# Patient Record
Sex: FEMALE | Race: WHITE | NOT HISPANIC OR LATINO | Employment: FULL TIME | URBAN - METROPOLITAN AREA
[De-identification: names, ages, dates, MRNs, and addresses within clinical notes are randomized per-mention and may not be internally consistent; named-entity substitution may affect disease eponyms.]

---

## 2019-08-12 ENCOUNTER — OFFICE VISIT (OUTPATIENT)
Dept: URGENT CARE | Facility: CLINIC | Age: 44
End: 2019-08-12
Payer: COMMERCIAL

## 2019-08-12 VITALS
SYSTOLIC BLOOD PRESSURE: 117 MMHG | WEIGHT: 148 LBS | BODY MASS INDEX: 23.78 KG/M2 | TEMPERATURE: 98 F | OXYGEN SATURATION: 99 % | HEIGHT: 66 IN | DIASTOLIC BLOOD PRESSURE: 77 MMHG | HEART RATE: 64 BPM

## 2019-08-12 DIAGNOSIS — S62.642A CLOSED NONDISPLACED FRACTURE OF PROXIMAL PHALANX OF RIGHT MIDDLE FINGER, INITIAL ENCOUNTER: ICD-10-CM

## 2019-08-12 DIAGNOSIS — M79.641 HAND PAIN, RIGHT: Primary | ICD-10-CM

## 2019-08-12 PROCEDURE — 3008F BODY MASS INDEX DOCD: CPT | Mod: CPTII,S$GLB,, | Performed by: NURSE PRACTITIONER

## 2019-08-12 PROCEDURE — 99203 OFFICE O/P NEW LOW 30 MIN: CPT | Mod: S$GLB,,, | Performed by: NURSE PRACTITIONER

## 2019-08-12 PROCEDURE — 3008F PR BODY MASS INDEX (BMI) DOCUMENTED: ICD-10-PCS | Mod: CPTII,S$GLB,, | Performed by: NURSE PRACTITIONER

## 2019-08-12 PROCEDURE — 73130 X-RAY EXAM OF HAND: CPT | Mod: FY,RT,S$GLB, | Performed by: RADIOLOGY

## 2019-08-12 PROCEDURE — 73130 XR HAND COMPLETE 3 VIEW RIGHT: ICD-10-PCS | Mod: FY,RT,S$GLB, | Performed by: RADIOLOGY

## 2019-08-12 PROCEDURE — 99203 PR OFFICE/OUTPT VISIT, NEW, LEVL III, 30-44 MIN: ICD-10-PCS | Mod: S$GLB,,, | Performed by: NURSE PRACTITIONER

## 2019-08-12 RX ORDER — MINERAL OIL
180 ENEMA (ML) RECTAL DAILY
COMMUNITY

## 2019-08-12 NOTE — PATIENT INSTRUCTIONS
Finger Fracture, Closed  You have a broken finger (fracture). This causes local pain, swelling, and bruising. This injury usually takes about 4 to 6 weeks to heal, but can take longer in some cases. Finger injuries are often treated with a splint or cast, or by taping the injured finger to the next one (hussain taping). This protects the injured finger and holds the bone in position while it heals. More serious fractures may need surgery.     If the fingernail has been severely injured, it will probably fall off in 1 to 2 weeks. A new fingernail will usually start to grow back within a month.  Home care  Follow these guidelines when caring for yourself at home:  · Keep your hand elevated to reduce pain and swelling. When sitting or lying down keep your arm above the level of your heart. You can do this by placing your arm on a pillow that rests on your chest or on a pillow at your side. This is most important during the first 2 days (48 hours) after the injury.  · Put an ice pack on the injured area. Do this for 20 minutes every 1 to 2 hours the first day for pain relief. You can make an ice pack by wrapping a plastic bag of ice cubes in a thin towel. As the ice melts, be careful that the cast or splint doesnt get wet. Continue using the ice pack 3 to 4 times a day until the pain and swelling go away.  · Keep the cast or splint completely dry at all times. Bathe with your cast or splint out of the water. Protect it with a large plastic bag, rubber-banded at the top end. If a fiberglass cast or splint gets wet, you can dry it with a hair dryer.  · If hussain tape was put on and it becomes wet or dirty, change it. You may replace it with paper, plastic, or cloth tape. Cloth tape and paper tapes must be kept dry. Keep the hussain tape in place for at least 4 weeks.  · You may use acetaminophen or ibuprofen to control pain, unless another pain medicine was prescribed. If you have chronic liver or kidney disease, talk with  your healthcare provider before using these medicines. Also talk with your provider if youve had a stomach ulcer or gastrointestinal bleeding.  · Dont put creams or objects under the cast if you have itching.  Follow-up care  Follow up with your healthcare provider, or as advised. This is to make sure the bone is healing the way it should.  X-rays may be taken. You will be told of any new findings that may affect your care.  When to seek medical advice  Call your healthcare provider right away if any of these occur:  · The plaster cast or splint becomes wet or soft  · The cast or splint cracks  · The fiberglass cast or splint stays wet for more than 24 hours  · Pain or swelling gets worse  · Redness, warmth, swelling, drainage from the wound, or foul odor from a cast or splint  · Finger becomes more cold, blue, numb, or tingly  · You cant move your finger  · The skin around the cast or splint becomes red  · Fever of 100.4ºF (38ºC) or higher, or as directed by your healthcare provider  Date Last Reviewed: 2/1/2017 © 2000-2017 [employer]. [address] [place] [address]. All rights reserved. This information is not intended as a substitute for professional medical care. Always follow your healthcare professional's instructions.

## 2019-08-12 NOTE — LETTER
August 12, 2019      [facility]  [place]  [address]  [place] [address]  Phone: [phone]  Fax: [phone]       Patient: Dominic Thomas   YOB: 1975  Date of Visit: 08/12/2019    To Whom It May Concern:    Fina Thomas  was at [facility] on 08/12/2019. She may return to work/school once cleared by ortho. She has an appt on 8/13/19 at 1:40 pm. If you have any questions or concerns, or if I can be of further assistance, please do not hesitate to contact me.    Sincerely,            Rhea John NP

## 2019-08-12 NOTE — PROGRESS NOTES
"Subjective:       Patient ID: Dominic Thomas is a 44 y.o. female.    Vitals:  height is 5' 6" (1.676 m) and weight is 67.1 kg (148 lb). Her tympanic temperature is 98.4 °F (36.9 °C). Her blood pressure is 117/77 and her pulse is 64. Her oxygen saturation is 99%.     Chief Complaint: Hand Injury (Right Hand 3rd Digit)    Hand Injury    Her dominant hand is their right hand. The incident occurred 12 to 24 hours ago. The incident occurred at home. Injury mechanism: Hyper extended 3rd digit. The pain is present in the right hand. The quality of the pain is described as aching. The pain does not radiate. The pain is at a severity of 9/10. The pain is severe. The pain has been constant since the incident. Pertinent negatives include no muscle weakness, numbness or tingling. Nothing aggravates the symptoms. She has tried nothing for the symptoms. The treatment provided no relief.       Constitution: Negative for chills, sweating, fatigue and fever.   Gastrointestinal: Negative for abdominal pain, nausea, vomiting and diarrhea.   Musculoskeletal: Positive for pain, joint pain, joint swelling and abnormal ROM of joint. Negative for muscle cramps and muscle ache.   Skin: Negative for color change, pale, rash and bruising.   Neurological: Negative for dizziness, history of vertigo, light-headedness, passing out, headaches and numbness.   Psychiatric/Behavioral: Negative for nervous/anxious, sleep disturbance and depression. The patient is not nervous/anxious.        Objective:      Physical Exam   Constitutional: She is oriented to person, place, and time. She appears well-developed and well-nourished.   HENT:   Head: Normocephalic and atraumatic.   Cardiovascular: Normal rate, regular rhythm and normal heart sounds.   Pulmonary/Chest: Effort normal and breath sounds normal.   Abdominal: Soft. Bowel sounds are normal. There is no tenderness.   Musculoskeletal: She exhibits edema and tenderness.        Hands:  Neurological: She " is alert and oriented to person, place, and time.   Skin: Skin is warm and dry.   Psychiatric: She has a normal mood and affect. Her behavior is normal. Judgment and thought content normal.   Nursing note and vitals reviewed.      Assessment:       1. Hand pain, right    2. Closed nondisplaced fracture of proximal phalanx of right middle finger, initial encounter        Plan:         1. Hand pain, right  No results found.    Notable fracture to lateral right middle prox phalanx. Advised on ice, motrin and follow up with ortho tomorrow as directed.   - X-Ray Hand 3 View Right; Future    2. Closed nondisplaced fracture of proximal phalanx of right middle finger, initial encounter  Has appt with Dr. Cleary tomorrow at 1:40

## 2019-10-06 ENCOUNTER — OFFICE VISIT (OUTPATIENT)
Dept: URGENT CARE | Facility: CLINIC | Age: 44
End: 2019-10-06
Payer: COMMERCIAL

## 2019-10-06 VITALS
BODY MASS INDEX: 23.63 KG/M2 | DIASTOLIC BLOOD PRESSURE: 83 MMHG | HEART RATE: 70 BPM | TEMPERATURE: 98 F | HEIGHT: 66 IN | WEIGHT: 147 LBS | RESPIRATION RATE: 18 BRPM | SYSTOLIC BLOOD PRESSURE: 139 MMHG

## 2019-10-06 DIAGNOSIS — L08.9 BACTERIAL SKIN INFECTION: Primary | ICD-10-CM

## 2019-10-06 DIAGNOSIS — B96.89 BACTERIAL SKIN INFECTION: Primary | ICD-10-CM

## 2019-10-06 DIAGNOSIS — R03.0 ELEVATED BLOOD PRESSURE READING: ICD-10-CM

## 2019-10-06 DIAGNOSIS — B35.4 RINGWORM OF BODY: ICD-10-CM

## 2019-10-06 DIAGNOSIS — L30.9 DERMATITIS: ICD-10-CM

## 2019-10-06 PROCEDURE — 3008F PR BODY MASS INDEX (BMI) DOCUMENTED: ICD-10-PCS | Mod: CPTII,S$GLB,, | Performed by: NURSE PRACTITIONER

## 2019-10-06 PROCEDURE — 99214 OFFICE O/P EST MOD 30 MIN: CPT | Mod: S$GLB,,, | Performed by: NURSE PRACTITIONER

## 2019-10-06 PROCEDURE — 99214 PR OFFICE/OUTPT VISIT, EST, LEVL IV, 30-39 MIN: ICD-10-PCS | Mod: S$GLB,,, | Performed by: NURSE PRACTITIONER

## 2019-10-06 PROCEDURE — 3008F BODY MASS INDEX DOCD: CPT | Mod: CPTII,S$GLB,, | Performed by: NURSE PRACTITIONER

## 2019-10-06 RX ORDER — SULFAMETHOXAZOLE AND TRIMETHOPRIM 800; 160 MG/1; MG/1
1 TABLET ORAL EVERY 12 HOURS
Qty: 14 TABLET | Refills: 0 | Status: SHIPPED | OUTPATIENT
Start: 2019-10-06 | End: 2019-10-13

## 2019-10-06 RX ORDER — PREDNISONE 20 MG/1
20 TABLET ORAL DAILY
Qty: 4 TABLET | Refills: 0 | Status: SHIPPED | OUTPATIENT
Start: 2019-10-06 | End: 2019-10-10

## 2019-10-06 RX ORDER — NORELGESTROMIN AND ETHINYL ESTRADIOL 150; 35 UG/D; UG/D
PATCH TRANSDERMAL
COMMUNITY
Start: 2019-10-02

## 2019-10-06 RX ORDER — CLOTRIMAZOLE AND BETAMETHASONE DIPROPIONATE 10; .64 MG/G; MG/G
CREAM TOPICAL 2 TIMES DAILY
Qty: 45 G | Refills: 0 | Status: SHIPPED | OUTPATIENT
Start: 2019-10-06

## 2019-10-06 RX ORDER — TRIAMCINOLONE ACETONIDE 1 MG/G
OINTMENT TOPICAL
Refills: 0 | COMMUNITY
Start: 2019-09-26

## 2019-10-06 NOTE — PATIENT INSTRUCTIONS
Follow up with family provider this week for recheck of blood pressure and dermatology for recheck of skin. If skin not improving, they may consider further testing.     You received a steroid oral script today - this can elevate your blood pressure, elevate your blood sugar, water weight gain, nervous energy, redness to the face and if given in injection form can cause dimpling of the skin where the shot goes in. If you are a diabetic, you should monitor your blood sugar three times daily for the next 4 days AND as needed for any signs of hyperglycemia. Go to nearest Emergency Department for any blood sugar over 300. There is a very small risk of osteonecrosis at site of injection but this is extremely rare and you should be re-evaluated if increasing pain, redness, swelling, or warmth experienced at site of injection.       Elevated Blood Pressure  Your blood pressure was elevated during your visit to the urgent care today.  It was not so high that immediate care was needed, but it is recommended that you monitor your blood pressure over the next week or two to make sure that it is not staying elevated.  If you are on blood pressure medication currently, continue as already prescribed. Please have your blood pressure taken 2-3 times daily at different times of the day.  Keep a log of these blood pressure readings and take it with you to see your Primary Care Physician.  Bring today's discharge papers as well to your follow up appointment. If your blood pressure is consistently above 140/90, you should follow-up with your PCP without delay. If you develop chest pain, shortness of breath, dizziness, vision changes, or any other concerning symptoms, you should seek immediate care in the Emergency Department.          Poison ivy/oak/sumac/other contact dermatitis home care relief:  1)  Cool to luke warm baths.    2)  Do not get over heated as this will increase itching.    3)  Caladryl lotion to affected areas.  Avoid gel based products as this may dry area out too fast increasing itching intensity.     4)  Aveeno Bath/Oatmeal bath twice a day.    5) Use bathing items such as Dove for sensitive skin or Aveeno bath versus products with cologne or alcohol in them.    6)  This rash can last up to 12 to 14 days before going away, no matter what medications you take.  These medications/remedies will only help to decrease swelling and itching.      7) If you received a steroid shot today - this can elevate your blood pressure, elevate your blood sugar, cause water weight gain, nervous energy, redness to the face and dimpling of the skin where the shot goes in. If you are a diabetic, you should monitor your blood sugar three times daily for the next 4 days AND as needed for any signs of hyperglycemia. Go to nearest Emergency Department for any blood sugar over 300. There is a very small risk of osteonecrosis at site of injection but this is extremely rare and you should be re-evaluated if increasing pain, redness, swelling, or warmth experienced at site of injection.     8)  You should schedule a follow-up appointment with your Primary Care Provider/Pediatrician for recheck in 2-3 days or as directed at this visit.     9)  If your condition fails to improve in a timely manner, you should receive another evaluation by your Primary Care Provider/Pediatrician to discuss your concerns or return to urgent care for a recheck.  If your condition worsens at any time, you should report immediately to your nearest Emergency Department for further evaluation especially if difficulty breathing, wheezing, chest pain or any other concerning symptoms develop. **You must understand that you have received Urgent Care treatment only and that you may be released before all of your medical problems are known or treated. You, the patient, are responsible to arrange for follow-up care as instructed.             Ringworm of the Skin    Ringworm is a  fungal infection of the skin. Despite the name, a worm doesn't cause it. The cause of ringworm is a fungus that infects the outer layers of the skin. It is also not caused by bed bugs, scabies, or lice. These are totally different.  The medical term for ringworm is tinea. It can affect most parts of your body, although it seems to do better in moist areas of the body and around hair. It can be on almost any part of your body, including:  Arms, hands, legs, chest, feet, and back  Scalp  Beard  Groin  Between the toes  Depending on where it is located, sometimes the name changes:  Tinea capitis (scalp)  Tinea cruris (groin)  Tinea corporis (body)  Tinea pedis (feet)  Causes  Ringworm is very common all over the world, including the [place] It can take less than 1 week up to 2 weeks before you develop the infection after being exposed. So, you may not figure out the exact cause.  It is spread through direct contact with:  An infected person or animal  Infected soil, or objects such as towels, clothing, and stubbs  Symptoms  At first you might not notice ringworm. Or you may just see a small, red, often raised itchy spot or pimple. Sometimes there may only be one spot. At other times there may be several. Ringworm can look slightly different on different parts of the body, but there are some things are always present:  Irregular, round, oval or ring-shaped, which is why it's called ringworm  Clearer or lighter color at the center, since it spreads from the center of the spot outward  Red or inflamed look  Raised  Itchy  Scaly, dry, or flaky  Home care  Follow these tips to help care for yourself at home:  Leave it alone. Don't scratch at the rash or pick it. This can increase the chance of infection and scarring.  Take medicine as prescribed. If you were prescribed a cream, apply it exactly as directed. Make sure to put the cream not just on the rash, but also on the skin 1 or 2 inches around it. Medicine by mouth  is sometimes needed, particularly for ringworm on the scalp. Take it as directed and until your healthcare provider says to stop.  Keep it from spreading to others. Untreated ringworm of the skin is contagious by skin-to-skin contact. Your child may return to school 2 days after treatment has started.  Prevention  To some degree, prevention depends on what part of your body was affected. In general, the following good hygiene can help.  Clean up after you get dirty or sweaty, or after using a locker room.  When possible, dont share stubbs and brushes.  Avoid having your skin and feet wet or damp for long periods.  Wear clean, loose-fitting underwear.  Follow-up care  Follow up with your healthcare provider as advised by our staff if the rash does not improve after 10 days of treatment or if the rash spreads to other areas of the body.  When to seek medical advice  Call your healthcare provider right away if any of these occur:  Redness around the rash gets worse  Fluid drains from the rash  Fever of 100.4ºF (38ºC) or higher, or as directed by your healthcare provider  Date Last Reviewed: 8/1/2016  © 4070-7500 [employer]. [address] [place] [address]. All rights reserved. This information is not intended as a substitute for professional medical care. Always follow your healthcare professional's instructions.          Discharge Instructions for Cellulitis  You have been diagnosed with cellulitis. This is an infection in the deepest layer of the skin. In some cases, the infection also affects the muscle. Cellulitis is caused by bacteria. The bacteria can enter the body through broken skin. This can happen with a cut, scratch, animal bite, or an insect bite that has been scratched. You may have been treated in the hospital with antibiotics and fluids. You will likely be given a prescription for antibiotics to take at home. This sheet will help you take care of yourself at home.  Home  care  When you are home:  Take the prescribed antibiotic medicine you are given as directed until it is gone. Take it even if you feel better. It treats the infection and stops it from returning. Not taking all the medicine can make future infections hard to treat.  Keep the infected area clean.  When possible, raise the infected area above the level of your heart. This helps keep swelling down.  Talk with your healthcare provider if you are in pain. Ask what kind of over-the-counter medicine you can take for pain.  Apply clean bandages as advised.  Take your temperature once a day for a week.  Wash your hands often to prevent spreading the infection.  In the future, wash your hands before and after you touch cuts, scratches, or bandages. This will help prevent infection.   When to call your healthcare provider  Call your healthcare provider immediately if you have any of the following:  Difficulty or pain when moving the joints above or below the infected area  Discharge or pus draining from the area  Fever of 100.4°F (38°C) or higher, or as directed by your healthcare provider  Pain that gets worse in or around the infected   Redness that gets worse in or around the infected area, particularly if the area of redness expands to a wider area  Shaking chills  Swelling of the infected area  Vomiting   Date Last Reviewed: 8/1/2016  © 9729-0602 [employer]. [address] [place] [address], [place] [address]. All rights reserved. This information is not intended as a substitute for professional medical care. Always follow your healthcare professional's instructions.      ·   ·   · Follow up with your primary care in 2-5 days if symptoms have not improved, or you may return here.  · If you were referred to a specialist, please follow up with that specialty.  · If you were prescribed antibiotics, please take them to completion.  · If you were prescribed a narcotic or any medication with sedative effects, do not drive  or operate heavy equipment or machinery while taking these medications.  · You must understand that you have received treatment at an Urgent Care facility only, and that you may be released before all of your medical problems are known or treated. Urgent Care facilities are not equipped to handle life threatening emergencies. It is recommended that you go to an Emergency Department for further evaluation of worsening or concerning symptoms, or possibly life threatening conditions as discussed.                                        If you  smoke, please stop smoking

## 2019-10-06 NOTE — PROGRESS NOTES
"Subjective:       Patient ID: Dominic Thmoas is a 44 y.o. female.    Vitals:  height is 5' 6" (1.676 m) and weight is 66.7 kg (147 lb). Her oral temperature is 98 °F (36.7 °C). Her blood pressure is 139/83 and her pulse is 70. Her respiration is 18.     Chief Complaint: Rash (rt arm)    Pt saw dermatology almost 10 days ago with some improvement, but now developed ring worm she thinks. Pt has chronic dermatitis issues she states, this is what rash started as, steroid shot helped a little, topical steroid not, no other meds prescribed.  On bcp patch.    Rash   This is a new problem. Episode onset: 2 weeks ago. The problem has been gradually worsening since onset. The affected locations include the right arm. The rash is characterized by itchiness, dryness, burning and redness. She was exposed to plant contact (Poison Ivy). Pertinent negatives include no congestion, cough, diarrhea, facial edema, fatigue, fever, joint pain, nail changes, rhinorrhea, shortness of breath, sore throat or vomiting. Treatments tried: Steroid shot, triaminolone cream, Cortisone, calamine, oatmeal. The treatment provided mild relief. There is no history of asthma or eczema.       Constitution: Negative for chills, fatigue and fever.   HENT: Negative for facial swelling, congestion and sore throat.    Neck: Negative for neck pain, neck stiffness and painful lymph nodes.   Cardiovascular: Negative for chest pain and sob on exertion.   Eyes: Negative for eye itching, eye redness and eyelid swelling.   Respiratory: Negative for cough and shortness of breath.    Gastrointestinal: Negative for abdominal pain, vomiting and diarrhea.   Genitourinary: Negative for dysuria and frequency.   Musculoskeletal: Negative for joint pain, joint swelling and muscle ache.   Skin: Positive for rash and erythema. Negative for pale, wound, abrasion, laceration, lesion, skin thickening/induration, puncture wound, bruising, abscess, avulsion and hives. "   Allergic/Immunologic: Positive for itching. Negative for environmental allergies, immunocompromised state and hives.   Neurological: Negative for light-headedness and headaches.   Hematologic/Lymphatic: Negative for swollen lymph nodes.       Objective:      Physical Exam   Constitutional: She is oriented to person, place, and time. She appears well-developed and well-nourished. She is cooperative.  Non-toxic appearance. She does not have a sickly appearance. She does not appear ill. No distress.   HENT:   Head: Normocephalic and atraumatic. Head is without abrasion, without contusion and without laceration.   Right Ear: External ear normal.   Left Ear: External ear normal.   Nose: Nose normal.   Mouth/Throat: Oropharynx is clear and moist and mucous membranes are normal. No posterior oropharyngeal edema or posterior oropharyngeal erythema.   Eyes: Pupils are equal, round, and reactive to light. Conjunctivae, EOM and lids are normal. No scleral icterus.   Neck: Trachea normal, normal range of motion, full passive range of motion without pain and phonation normal. Neck supple. No tracheal deviation present.   Cardiovascular: Normal rate, regular rhythm, normal heart sounds and intact distal pulses. PMI is not displaced.   No murmur heard.  Pulmonary/Chest: Effort normal and breath sounds normal. No stridor. No respiratory distress. She has no wheezes. She exhibits no tenderness.   Abdominal: Soft. There is no tenderness.   Musculoskeletal: Normal range of motion. She exhibits no edema or deformity.   Lymphadenopathy:     She has no cervical adenopathy.   Neurological: She is alert and oriented to person, place, and time.   Skin: Skin is warm, dry and intact. Capillary refill takes less than 2 seconds. Rash noted. No abrasion, no bruising, no burn, no ecchymosis, no laceration and no lesion noted. She is not diaphoretic. There is erythema.   x2 circular rashes with central clearing to right forearm and elbow, pt  does have additionally erythematous, dry and excoriated skin. There are some small petechiae noted, likely from scratching given pattern. See pictures.   Psychiatric: She has a normal mood and affect. Her speech is normal and behavior is normal. Judgment and thought content normal. Cognition and memory are normal.   Nursing note and vitals reviewed.                  Assessment:       1. Bacterial skin infection    2. Ringworm of body    3. Dermatitis    4. Elevated blood pressure reading        Plan:     Given the erythema of arm as well, will treat for underlying early cellulitis/erysipsellas. Pt to follow up with dermatology for recheck, consideration of scraping or biopsy if not improving. BP elevation, pt states no history htn, no associated symptoms. No evidence end organ damage on exam.     Bacterial skin infection  -     sulfamethoxazole-trimethoprim 800-160mg (BACTRIM DS) 800-160 mg Tab; Take 1 tablet by mouth every 12 (twelve) hours. for 7 days  Dispense: 14 tablet; Refill: 0    Ringworm of body  -     clotrimazole-betamethasone 1-0.05% (LOTRISONE) cream; Apply topically 2 (two) times daily.  Dispense: 45 g; Refill: 0    Dermatitis  -     predniSONE (DELTASONE) 20 MG tablet; Take 1 tablet (20 mg total) by mouth once daily. for 4 days  Dispense: 4 tablet; Refill: 0    Elevated blood pressure reading          Patient Instructions         Follow up with family provider this week for recheck of blood pressure and dermatology for recheck of skin. If skin not improving, they may consider further testing.     You received a steroid oral script today - this can elevate your blood pressure, elevate your blood sugar, water weight gain, nervous energy, redness to the face and if given in injection form can cause dimpling of the skin where the shot goes in. If you are a diabetic, you should monitor your blood sugar three times daily for the next 4 days AND as needed for any signs of hyperglycemia. Go to nearest  Emergency Department for any blood sugar over 300. There is a very small risk of osteonecrosis at site of injection but this is extremely rare and you should be re-evaluated if increasing pain, redness, swelling, or warmth experienced at site of injection.       Elevated Blood Pressure  Your blood pressure was elevated during your visit to the urgent care today.  It was not so high that immediate care was needed, but it is recommended that you monitor your blood pressure over the next week or two to make sure that it is not staying elevated.  If you are on blood pressure medication currently, continue as already prescribed. Please have your blood pressure taken 2-3 times daily at different times of the day.  Keep a log of these blood pressure readings and take it with you to see your Primary Care Physician.  Bring today's discharge papers as well to your follow up appointment. If your blood pressure is consistently above 140/90, you should follow-up with your PCP without delay. If you develop chest pain, shortness of breath, dizziness, vision changes, or any other concerning symptoms, you should seek immediate care in the Emergency Department.          Poison ivy/oak/sumac/other contact dermatitis home care relief:  1)  Cool to luke warm baths.    2)  Do not get over heated as this will increase itching.    3)  Caladryl lotion to affected areas. Avoid gel based products as this may dry area out too fast increasing itching intensity.     4)  Aveeno Bath/Oatmeal bath twice a day.    5) Use bathing items such as Dove for sensitive skin or Aveeno bath versus products with cologne or alcohol in them.    6)  This rash can last up to 12 to 14 days before going away, no matter what medications you take.  These medications/remedies will only help to decrease swelling and itching.      7) If you received a steroid shot today - this can elevate your blood pressure, elevate your blood sugar, cause water weight gain, nervous  energy, redness to the face and dimpling of the skin where the shot goes in. If you are a diabetic, you should monitor your blood sugar three times daily for the next 4 days AND as needed for any signs of hyperglycemia. Go to nearest Emergency Department for any blood sugar over 300. There is a very small risk of osteonecrosis at site of injection but this is extremely rare and you should be re-evaluated if increasing pain, redness, swelling, or warmth experienced at site of injection.     8)  You should schedule a follow-up appointment with your Primary Care Provider/Pediatrician for recheck in 2-3 days or as directed at this visit.     9)  If your condition fails to improve in a timely manner, you should receive another evaluation by your Primary Care Provider/Pediatrician to discuss your concerns or return to urgent care for a recheck.  If your condition worsens at any time, you should report immediately to your nearest Emergency Department for further evaluation especially if difficulty breathing, wheezing, chest pain or any other concerning symptoms develop. **You must understand that you have received Urgent Care treatment only and that you may be released before all of your medical problems are known or treated. You, the patient, are responsible to arrange for follow-up care as instructed.             Ringworm of the Skin    Ringworm is a fungal infection of the skin. Despite the name, a worm doesn't cause it. The cause of ringworm is a fungus that infects the outer layers of the skin. It is also not caused by bed bugs, scabies, or lice. These are totally different.  The medical term for ringworm is tinea. It can affect most parts of your body, although it seems to do better in moist areas of the body and around hair. It can be on almost any part of your body, including:  Arms, hands, legs, chest, feet, and back  Scalp  Beard  Groin  Between the toes  Depending on where it is located, sometimes the name  changes:  Tinea capitis (scalp)  Tinea cruris (groin)  Tinea corporis (body)  Tinea pedis (feet)  Causes  Ringworm is very common all over the world, including the [place] It can take less than 1 week up to 2 weeks before you develop the infection after being exposed. So, you may not figure out the exact cause.  It is spread through direct contact with:  An infected person or animal  Infected soil, or objects such as towels, clothing, and stubbs  Symptoms  At first you might not notice ringworm. Or you may just see a small, red, often raised itchy spot or pimple. Sometimes there may only be one spot. At other times there may be several. Ringworm can look slightly different on different parts of the body, but there are some things are always present:  Irregular, round, oval or ring-shaped, which is why it's called ringworm  Clearer or lighter color at the center, since it spreads from the center of the spot outward  Red or inflamed look  Raised  Itchy  Scaly, dry, or flaky  Home care  Follow these tips to help care for yourself at home:  Leave it alone. Don't scratch at the rash or pick it. This can increase the chance of infection and scarring.  Take medicine as prescribed. If you were prescribed a cream, apply it exactly as directed. Make sure to put the cream not just on the rash, but also on the skin 1 or 2 inches around it. Medicine by mouth is sometimes needed, particularly for ringworm on the scalp. Take it as directed and until your healthcare provider says to stop.  Keep it from spreading to others. Untreated ringworm of the skin is contagious by skin-to-skin contact. Your child may return to school 2 days after treatment has started.  Prevention  To some degree, prevention depends on what part of your body was affected. In general, the following good hygiene can help.  Clean up after you get dirty or sweaty, or after using a locker room.  When possible, dont share stubbs and brushes.  Avoid having your skin and  feet wet or damp for long periods.  Wear clean, loose-fitting underwear.  Follow-up care  Follow up with your healthcare provider as advised by our staff if the rash does not improve after 10 days of treatment or if the rash spreads to other areas of the body.  When to seek medical advice  Call your healthcare provider right away if any of these occur:  Redness around the rash gets worse  Fluid drains from the rash  Fever of 100.4ºF (38ºC) or higher, or as directed by your healthcare provider  Date Last Reviewed: 8/1/2016 © 2000-2017 [employer]. [address] [place] [id number]. All rights reserved. This information is not intended as a substitute for professional medical care. Always follow your healthcare professional's instructions.          Discharge Instructions for Cellulitis  You have been diagnosed with cellulitis. This is an infection in the deepest layer of the skin. In some cases, the infection also affects the muscle. Cellulitis is caused by bacteria. The bacteria can enter the body through broken skin. This can happen with a cut, scratch, animal bite, or an insect bite that has been scratched. You may have been treated in the hospital with antibiotics and fluids. You will likely be given a prescription for antibiotics to take at home. This sheet will help you take care of yourself at home.  Home care  When you are home:  Take the prescribed antibiotic medicine you are given as directed until it is gone. Take it even if you feel better. It treats the infection and stops it from returning. Not taking all the medicine can make future infections hard to treat.  Keep the infected area clean.  When possible, raise the infected area above the level of your heart. This helps keep swelling down.  Talk with your healthcare provider if you are in pain. Ask what kind of over-the-counter medicine you can take for pain.  Apply clean bandages as advised.  Take your temperature once a day for a  week.  Wash your hands often to prevent spreading the infection.  In the future, wash your hands before and after you touch cuts, scratches, or bandages. This will help prevent infection.   When to call your healthcare provider  Call your healthcare provider immediately if you have any of the following:  Difficulty or pain when moving the joints above or below the infected area  Discharge or pus draining from the area  Fever of 100.4°F (38°C) or higher, or as directed by your healthcare provider  Pain that gets worse in or around the infected   Redness that gets worse in or around the infected area, particularly if the area of redness expands to a wider area  Shaking chills  Swelling of the infected area  Vomiting   Date Last Reviewed: 8/1/2016  © 7104-7821 [employer]. [address] [place] [address], [place] [address]. All rights reserved. This information is not intended as a substitute for professional medical care. Always follow your healthcare professional's instructions.      ·   ·   · Follow up with your primary care in 2-5 days if symptoms have not improved, or you may return here.  · If you were referred to a specialist, please follow up with that specialty.  · If you were prescribed antibiotics, please take them to completion.  · If you were prescribed a narcotic or any medication with sedative effects, do not drive or operate heavy equipment or machinery while taking these medications.  · You must understand that you have received treatment at an Urgent Care facility only, and that you may be released before all of your medical problems are known or treated. Urgent Care facilities are not equipped to handle life threatening emergencies. It is recommended that you go to an Emergency Department for further evaluation of worsening or concerning symptoms, or possibly life threatening conditions as discussed.                                        If you  smoke, please stop smoking

## 2020-06-16 ENCOUNTER — OFFICE VISIT (OUTPATIENT)
Dept: URGENT CARE | Facility: CLINIC | Age: 45
End: 2020-06-16
Payer: COMMERCIAL

## 2020-06-16 VITALS — TEMPERATURE: 99 F

## 2020-06-16 DIAGNOSIS — Z20.822 EXPOSURE TO COVID-19 VIRUS: Primary | ICD-10-CM

## 2020-06-16 PROCEDURE — 99211 PR OFFICE/OUTPT VISIT, EST, LEVL I: ICD-10-PCS | Mod: S$GLB,,, | Performed by: PHYSICIAN ASSISTANT

## 2020-06-16 PROCEDURE — U0003 INFECTIOUS AGENT DETECTION BY NUCLEIC ACID (DNA OR RNA); SEVERE ACUTE RESPIRATORY SYNDROME CORONAVIRUS 2 (SARS-COV-2) (CORONAVIRUS DISEASE [COVID-19]), AMPLIFIED PROBE TECHNIQUE, MAKING USE OF HIGH THROUGHPUT TECHNOLOGIES AS DESCRIBED BY CMS-2020-01-R: HCPCS

## 2020-06-16 PROCEDURE — 99211 OFF/OP EST MAY X REQ PHY/QHP: CPT | Mod: S$GLB,,, | Performed by: PHYSICIAN ASSISTANT

## 2020-06-16 NOTE — PATIENT INSTRUCTIONS
Instructions for Patients with Confirmed or Suspected COVID-19    If you are awaiting your test result, you will either be called or it will be released to the patient portal.  If you have any questions about your test, please visit [email or web address] or call our COVID-19 information line at [phone].      Preventing the Spread of Coronavirus Disease 2019 (COVID-19) in Homes and Residential Communities -- Patients     Prevention steps for people with confirmed or suspected COVID-19 (including persons under investigation) who do not need to be hospitalized and people with confirmed COVID-19 who were hospitalized and determined to be medically stable to go home.    Stay home except to get medical care.    Separate yourself from other people and animals in your home.    Call ahead before visiting your doctor.    Wear a face mask.    Cover your coughs and sneezes.    Clean your hands often.    Avoid sharing personal household items.    Clean all high-touch surfaces every day.    Monitor your symptoms. Seek prompt medical attention if your illness is worsening (e.g., difficulty breathing). Before seeking care, call your healthcare provider.    If you have a medical emergency and must call 911, notify the dispatcher that you have or are being evaluated for COVID-19. If possible, put on a face mask before emergency medical services arrive.    Use the following symptom-based strategy to return to normal activity following a suspected or confirmed case of COVID-19. Continue isolation until:   o At least 3 days (72 hours) have passed since recovery defined as resolution of fever without the use of fever-reducing medications and improvement in respiratory symptoms (e.g. cough, shortness of breath), and   o At least 10 days have passed since symptoms first appeared.     Precautions for household members, intimate partners and caregivers in a non-healthcare setting of a patient with symptomatic  laboratory-confirmed COVID-19 or a patient under investigation.     Household members, intimate partners and caregivers in a non-healthcare setting may have close contact with a person with symptomatic, laboratory-confirmed COVID-19 or a person under investigation. Close contacts should monitor their health; they should call their healthcare provider right away if they develop symptoms suggestive of COVID-19 (e.g., fever, cough, shortness of breath). Close contacts should also follow these recommendations:      Make sure that you understand and can help the patient follow their healthcare providers instructions for medication(s) and care. You should help the patient with basic needs in the home and provide support for getting groceries, prescriptions, and other personal needs.    Monitor the patients symptoms. If the patient is getting sicker, call his or her healthcare provider and tell them that the patient has laboratory-confirmed COVID-19. This will help the healthcare providers office take steps to keep people in the office or waiting room from getting infected. Ask the healthcare provider to call the local or [facility] health department for additional guidance. If the patient has a medical emergency and you need to call 911, notify the dispatch personnel that the patient has or is being evaluated for COVID-19.    Household members should stay in another room or be  from the patient as much as possible. Household members should use a separate bedroom and bathroom, if available.    Prohibit visitors who do not have an essential need to be in the home.    Household members should care for any pets. Do not handle pets or other animals while sick.    Make sure that shared spaces in the home have good air flow, such as by an air conditioner.    Perform hand hygiene frequently. Wash your hands often with soap and water for at least 20 seconds or use an alcohol-based hand  that contains 60 to  95% alcohol, covering all surfaces of your hands and rubbing them together until they feel dry. Soap and water are preferred if hands are visibly dirty.    Avoid touching your eyes, nose and mouth with unwashed hands.    The patient should wear a face mask when you are around other people. If the patient is not able to wear a face mask (for example, because it causes trouble breathing), you, as the caregiver, should wear a mask when you are in the same room as the patient.    Wear a disposable face mask and gloves when you touch or have contact with the patients blood, stool or body fluids, such as saliva, sputum, nasal mucus, vomit and urine.   o Throw out disposable face masks and gloves after using them. Do not reuse.   o When removing personal protective equipment, first remove and dispose of gloves. Then, immediately clean your hands with soap and water or alcohol-based hand . Next, remove and dispose of face mask, and immediately clean your hands again with soap and water or alcohol-based hand .    Avoid sharing household items with the patient. You should not share dishes, drinking glasses, cups, eating utensils, towels, bedding or other items. After the patient uses these items, you should wash them thoroughly (see below Wash laundry thoroughly).    Clean all high-touch surfaces, such as counters, tabletops, doorknobs, bathroom fixtures, toilets, phones, keyboards, tablets and bedside tables, every day. Also, clean any surfaces that may have blood, stool or body fluids on them.   o Use a household cleaning spray or wipe, according to the label instructions. Labels contain instructions for safe and effective use of the cleaning product including precautions you should take when applying the product, such as wearing gloves and making sure you have good ventilation during use of the product.    Wash laundry thoroughly.   o Immediately remove and wash clothes or bedding that have  blood, stool or body fluids on them.  o Wear disposable gloves while handling soiled items and keep soiled items away from your body. Clean your hands (with soap and water or an alcohol-based hand ) immediately after removing your gloves.   o Read and follow directions on labels of laundry or clothing items and detergent. In general, using a normal laundry detergent according to washing machine instructions and dry thoroughly using the warmest temperatures recommended on the clothing label.    Place all used disposable gloves, face masks and other contaminated items in a lined container before disposing of them with other household waste. Clean your hands (with soap and water or an alcohol-based hand ) immediately after handling these items. Soap and water should be used preferentially if hands are visibly dirty.   Discuss any additional questions with your state or local health department

## 2020-06-16 NOTE — PROGRESS NOTES
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 No symptoms.    requesting COVID testing.    Counseled patient and answered questions in regards to COVID 19 testing and diagnosis

## 2020-06-17 LAB — SARS-COV-2 RNA RESP QL NAA+PROBE: NOT DETECTED

## 2020-06-18 ENCOUNTER — TELEPHONE (OUTPATIENT)
Dept: URGENT CARE | Facility: CLINIC | Age: 45
End: 2020-06-18

## 2020-06-18 NOTE — TELEPHONE ENCOUNTER
No visits with results within 1 Day(s) from this visit.   Latest known visit with results is:   Office Visit on 06/16/2020   Component Date Value Ref Range Status    SARS-CoV2 (COVID-19) Qualitative P* 06/16/2020 Not Detected  Not Detected Final    Comment: This test utilizes a real-time reverse transcription  polymerase chain reaction procedure to amplify and   detect the SARS-CoV-2 RdRp and N genes.    The analytical sensitivity (limit of detection) of   this assay is 100 copies/mL.   A Detected result is considered positive for COVID-19.  This patient is considered infected with the   SARS-CoV-2 virus and is presumed to be contagious.    A Not Detected result means that SARS-CoV-2 RNA is not  present above the limit of detection. It does not rule  out the possibility of COVID-19 and should not be the  sole basis for treatment decisions.  If COVID-19 is   strongly suspected based on clinical and exposure   history,re-testing should be considered.    This test is only for use under Food and Drug   Administration s Emergency Use Authorization (EUA).   Commercial reagents are provided by [employer].  Performance characteristics of the EUA have been   independently verified by [facility]   Department of Pathology and L                           aboratory Medicine.             called to review neg covid test result; left message to return call

## 2020-06-18 NOTE — TELEPHONE ENCOUNTER
No visits with results within 1 Day(s) from this visit.   Latest known visit with results is:   Office Visit on 06/16/2020   Component Date Value Ref Range Status    SARS-CoV2 (COVID-19) Qualitative P* 06/16/2020 Not Detected  Not Detected Final    Comment: This test utilizes a real-time reverse transcription  polymerase chain reaction procedure to amplify and   detect the SARS-CoV-2 RdRp and N genes.    The analytical sensitivity (limit of detection) of   this assay is 100 copies/mL.   A Detected result is considered positive for COVID-19.  This patient is considered infected with the   SARS-CoV-2 virus and is presumed to be contagious.    A Not Detected result means that SARS-CoV-2 RNA is not  present above the limit of detection. It does not rule  out the possibility of COVID-19 and should not be the  sole basis for treatment decisions.  If COVID-19 is   strongly suspected based on clinical and exposure   history,re-testing should be considered.    This test is only for use under Food and Drug   Administration s Emergency Use Authorization (EUA).   Commercial reagents are provided by [employer].  Performance characteristics of the EUA have been   independently verified by [facility]   Department of Pathology and L                           aboratory Medicine.         pt returned call, reviewed neg covid test, no questions or concerns.